# Patient Record
Sex: FEMALE | Race: ASIAN
[De-identification: names, ages, dates, MRNs, and addresses within clinical notes are randomized per-mention and may not be internally consistent; named-entity substitution may affect disease eponyms.]

---

## 2017-06-27 ENCOUNTER — HOSPITAL ENCOUNTER (INPATIENT)
Dept: HOSPITAL 74 - JDEL | Age: 32
LOS: 4 days | Discharge: HOME | End: 2017-07-01
Attending: OBSTETRICS & GYNECOLOGY | Admitting: OBSTETRICS & GYNECOLOGY
Payer: COMMERCIAL

## 2017-06-27 VITALS — BODY MASS INDEX: 20.9 KG/M2

## 2017-06-27 DIAGNOSIS — R07.89: ICD-10-CM

## 2017-06-27 DIAGNOSIS — Z3A.39: ICD-10-CM

## 2017-06-27 DIAGNOSIS — O34.211: Primary | ICD-10-CM

## 2017-06-27 DIAGNOSIS — D64.89: ICD-10-CM

## 2017-06-27 LAB
ANION GAP SERPL CALC-SCNC: 11 MMOL/L (ref 8–16)
ANION GAP SERPL CALC-SCNC: 11 MMOL/L (ref 8–16)
APTT BLD: 24.9 SECONDS (ref 26.9–34.4)
APTT BLD: 25 SECONDS (ref 26.9–34.4)
BASOPHILS # BLD: 0.5 % (ref 0–2)
BASOPHILS # BLD: 0.8 % (ref 0–2)
CALCIUM SERPL-MCNC: 8.3 MG/DL (ref 8.5–10.1)
CALCIUM SERPL-MCNC: 8.9 MG/DL (ref 8.5–10.1)
CO2 SERPL-SCNC: 21 MMOL/L (ref 21–32)
CO2 SERPL-SCNC: 22 MMOL/L (ref 21–32)
CREAT SERPL-MCNC: 0.5 MG/DL (ref 0.55–1.02)
CREAT SERPL-MCNC: 0.5 MG/DL (ref 0.55–1.02)
DEPRECATED RDW RBC AUTO: 27.8 % (ref 11.6–15.6)
DEPRECATED RDW RBC AUTO: 28.8 % (ref 11.6–15.6)
EOSINOPHIL # BLD: 0.3 % (ref 0–4.5)
EOSINOPHIL # BLD: 0.3 % (ref 0–4.5)
GLUCOSE SERPL-MCNC: 108 MG/DL (ref 74–106)
GLUCOSE SERPL-MCNC: 96 MG/DL (ref 74–106)
HIV 1 & 2 AB: NEGATIVE
HIV 1 AGP24: NEGATIVE
INR BLD: 0.91 (ref 0.82–1.09)
INR BLD: 0.95 (ref 0.82–1.09)
MCH RBC QN AUTO: 26.5 PG (ref 25.7–33.7)
MCH RBC QN AUTO: 26.9 PG (ref 25.7–33.7)
MCHC RBC AUTO-ENTMCNC: 32.4 G/DL (ref 32–36)
MCHC RBC AUTO-ENTMCNC: 33.1 G/DL (ref 32–36)
MCV RBC: 81.3 FL (ref 80–96)
MCV RBC: 81.8 FL (ref 80–96)
NEUTROPHILS # BLD: 68.4 % (ref 42.8–82.8)
NEUTROPHILS # BLD: 71.2 % (ref 42.8–82.8)
PLATELET # BLD AUTO: 279 K/MM3 (ref 134–434)
PLATELET # BLD AUTO: 291 K/MM3 (ref 134–434)
PMV BLD: 8.5 FL (ref 7.5–11.1)
PMV BLD: 8.7 FL (ref 7.5–11.1)
PT PNL PPP: 10 SEC (ref 9.98–11.88)
PT PNL PPP: 10.4 SEC (ref 9.98–11.88)
WBC # BLD AUTO: 10.8 K/MM3 (ref 4–10)
WBC # BLD AUTO: 10.9 K/MM3 (ref 4–10)

## 2017-06-27 RX ADMIN — SODIUM CHLORIDE, SODIUM GLUCONATE, SODIUM ACETATE, POTASSIUM CHLORIDE, AND MAGNESIUM CHLORIDE SCH MLS/HR: 526; 502; 368; 37; 30 INJECTION, SOLUTION INTRAVENOUS at 21:50

## 2017-06-27 NOTE — HP
Past Medical History





- Primary Care Physician


PCP:: Mallory Menendez





- Admission


Chief Complaint: Spontaneous rupture of memmbranes


History of Present Illness: 


32 yo  EDC 17 ega 39.6 weeks with c/o srom at 630pm no bleeding or HA 


uncomplicated prenatal 


History Source: Patient





- Past Medical History


...: 2


...Para: 0


...Term: 0


...: 0


...Spon : 1


...Induced : 0


...Multiple Gestation: 0


...LMP: 16


... Weeks Gestation by Dates: 39.5


...EDC by Dates: 17


...EDC by Sono: 17


Additional OB History: Missed ab





- Past Surgical History


Past Surgical History: Yes: None


Hx Myomectomy: No


Hx Transabdominal Cerclage: No


Additional Surgical History: DC x 1 





- Smoking History


Smoking history: Never smoked


Have you smoked in the past 12 months: No





- Alcohol/Substance Use


Hx Alcohol Use: No


History of Substance Use: reports: None





- Social History


Usual Living Arrangement: Yes: With Spouse


History of Recent Travel: No





Home Medications





- Allergies


Allergies/Adverse Reactions: 


 Allergies











Allergy/AdvReac Type Severity Reaction Status Date / Time


 


tetracycline Allergy Severe  Verified 16 11:14


 


ibuprofen [From Motrin] Allergy Intermediate  Verified 16 11:14














- Home Medications


Home Medications: 


Ambulatory Orders





Iron 1 tab PO BID 17 


Prenatal Vitamins (Sjr) - 1 tab PO DAILY 17 











Review of Systems





- Review of Systems


Constitutional: reports: No Symptoms


Eyes: reports: No Symptoms


HENT: reports: No Symptoms


Neck: reports: No Symptoms


Cardiovascular: reports: No Symptoms


Respiratory: reports: No Symptoms


Gastrointestinal: reports: Abdominal Pain


Genitourinary: reports: No Symptoms


Breasts: reports: No Symptoms Reported


Musculoskeletal: reports: No Symptoms


Integumentary: reports: No Symptoms


Neurological: reports: No Symptoms


Endocrine: reports: No Symptoms


Hematology/Lymphatic: reports: No Symptoms


Psychiatric: reports: No Symptoms





Physical Exam - Maternity


Vital Signs: 


 Vital Signs











Temperature  98.7 F   17 21:00


 


Pulse Rate  114 H  17 21:00


 


Respiratory Rate  18   17 21:00


 


Blood Pressure  118/73   17 21:00


 


O2 Sat by Pulse Oximetry (%)      











Constitutional: Yes: Well Nourished, No Distress


HENT: Yes: WNL


Neck: Yes: WNL


Cardiovascular: Yes: WNL


Lungs: Clear to auscultation


Breast(s): Yes: WNL





- Abdominal Exam/OB


Fundal Height: 40


Number of Fetuses: Single


Fetal Presentation: Vertex


Contractions: Yes


Regularity: Irritability


Intensity: Mild/Mod


Fetal Monitor Mode: External


Category: I


Accelerations: Non-Uniform


Decelerations: None





- Vaginal Exam/OB


Vaginal Bleediing: No


Dilatation (cm): 1


Effacement (%): 80


Amniotic Membrane Status: Ruptured


Amniotic Fluid: Yes: Clear


Fetal Presentation: Vertex/Position





- Physical Exam


Musculoskeletal: Yes: WNL


Extremities: Yes: WNL


Edema: No


Psychiatric: Yes: WNL, Alert, Oriented





- Labs


Lab Results: 


 CBC, BMP





 17 19:50 





 17 19:50 











Hemorrhage Risk Assessment





- Risk Factors


Risk Score: 0


Risk Level: Low Risk





Problem List





- Problems


(1) Spontaneous rupture of membranes


Code(s): [id number] - 








Assessment/Plan


IUP at 39.5 weeks


SROM


GBS neg


Hx of DC





Plan


Admit 


will observe


Stadol as needed

## 2017-06-28 LAB
ALBUMIN SERPL-MCNC: 2.5 G/DL (ref 3.4–5)
ALP SERPL-CCNC: 190 U/L (ref 45–117)
ALT SERPL-CCNC: 13 U/L (ref 12–78)
ANION GAP SERPL CALC-SCNC: 10 MMOL/L (ref 8–16)
APPEARANCE UR: CLEAR
AST SERPL-CCNC: 14 U/L (ref 15–37)
BASOPHILS # BLD: 1.1 % (ref 0–2)
BILIRUB SERPL-MCNC: 0.2 MG/DL (ref 0.2–1)
BILIRUB UR STRIP.AUTO-MCNC: NEGATIVE MG/DL
CALCIUM SERPL-MCNC: 8.3 MG/DL (ref 8.5–10.1)
CO2 SERPL-SCNC: 22 MMOL/L (ref 21–32)
COLOR UR: (no result)
CREAT SERPL-MCNC: 0.5 MG/DL (ref 0.55–1.02)
DEPRECATED RDW RBC AUTO: 29.5 % (ref 11.6–15.6)
EOSINOPHIL # BLD: 0.3 % (ref 0–4.5)
GLUCOSE SERPL-MCNC: 94 MG/DL (ref 74–106)
KETONES UR QL STRIP: NEGATIVE
LEUKOCYTE ESTERASE UR QL STRIP.AUTO: NEGATIVE
MCH RBC QN AUTO: 26.4 PG (ref 25.7–33.7)
MCHC RBC AUTO-ENTMCNC: 32.1 G/DL (ref 32–36)
MCV RBC: 82.1 FL (ref 80–96)
NEUTROPHILS # BLD: 64.2 % (ref 42.8–82.8)
NITRITE UR QL STRIP: NEGATIVE
PH BLDV: 7.39 [PH] (ref 7.32–7.42)
PH UR: 7 [PH] (ref 5–8)
PLATELET # BLD AUTO: 232 K/MM3 (ref 134–434)
PMV BLD: 8.4 FL (ref 7.5–11.1)
PROT SERPL-MCNC: 6 G/DL (ref 6.4–8.2)
PROT UR QL STRIP: NEGATIVE
PROT UR QL STRIP: NEGATIVE
RBC # BLD AUTO: 12 /HPF (ref 0–3)
RBC # UR STRIP: (no result) /UL
SAO2 % BLDV: 71.4 MEQ/L (ref 19–25)
SP GR UR: 1.01 (ref 1–1.02)
TROPONIN I SERPL-MCNC: < 0.02 NG/ML (ref 0–0.05)
UROBILINOGEN UR STRIP-MCNC: NEGATIVE E.U./DL (ref 0.2–1)
WBC # BLD AUTO: 11.2 K/MM3 (ref 4–10)
WBC # UR AUTO: 4 /HPF (ref 3–5)

## 2017-06-28 RX ADMIN — SODIUM CHLORIDE, SODIUM GLUCONATE, SODIUM ACETATE, POTASSIUM CHLORIDE, AND MAGNESIUM CHLORIDE SCH MLS/HR: 526; 502; 368; 37; 30 INJECTION, SOLUTION INTRAVENOUS at 08:05

## 2017-06-28 RX ADMIN — ACETAMINOPHEN PRN MG: 325 TABLET ORAL at 23:00

## 2017-06-28 NOTE — HOSP
Physical Examination


Vital Signs: 


 Vital Signs











Temperature  97.9 F   06/28/17 10:00


 


Pulse Rate  87   06/28/17 11:00


 


Respiratory Rate  20   06/28/17 11:00


 


Blood Pressure  120/87   06/28/17 11:00


 


O2 Sat by Pulse Oximetry (%)      











Labs: 


 CBC, BMP





 06/28/17 06:43 





 06/28/17 06:43 











Hospitalist Encounter


Assessment: 


Labs returned and patient's Troponins were negative .  CMP had elevated Alk 

phos of 190.  Patient still complains of a headache and is still anxious.    





PLAN:


-Repeat Troponin with repeat EKG


-Tylenol 650mg Q6H PRN for headache 


-Will follow up on labs 





Visit type





- Emergency Visit


Emergency Visit: Yes


ED Registration Date: 06/27/17


Care time: The patient presented to the Emergency Department on the above date 

and was hospitalized for further evaluation of their emergent condition.





- New Patient


This patient is new to me today: Yes


Date on this admission: 06/28/17





- Critical Care


Critical Care patient: No

## 2017-06-28 NOTE — HOSP
Subjective





- Review of Symptoms


Subjective: 


Called for evaluation of chest pain, dizziness, hallucinations post Stadol





At bedside pt. is [id number]





Physical:


/82, rr 18 02 98%





GEN: Nad, resting in bed


HEENT: NCAT, PERRL


CARD: RRR S1, S2


RESP: CTAB


ABD: BSX4, NTD to palpation


EXT: - C/C/E





EKG





A/P.)


Chest Pain/Dizziness


- Most likely medication reaction Avoid Stadol


- Follow Troponin, CMP, UA and CBC





- Will continue to follow pt.























Physical Examination


Vital Signs: 


 Vital Signs











Temperature  97.8 F   06/28/17 06:00


 


Pulse Rate  86   06/28/17 06:00


 


Respiratory Rate  20   06/28/17 06:00


 


Blood Pressure  130/69   06/28/17 06:00


 


O2 Sat by Pulse Oximetry (%)

## 2017-06-28 NOTE — OP
Operative Note





- Note:


Operative Date: 17


Pre-Operative Diagnosis: Elective .  spontaneous rupture of membranes.  

IUp at 39 weeks


Operation: Primary Low transverse  Section


Findings: 


Live female infant


Post-Operative Diagnosis: Same as Pre-op


Surgeon: Mallory Menendez


Assistant: Saturnino Caldera


Anesthesia: Spinal


Estimated Blood Loss (mls): 500


Operative Report Dictated: Yes

## 2017-06-28 NOTE — PN
Ante-Partal Exam





- Subjective


Subjective: 


Pt with chest pain 


Vital Signs: 


 Vital Signs











Temperature  97.8 F   17 06:00


 


Pulse Rate  86   17 06:00


 


Respiratory Rate  20   17 06:00


 


Blood Pressure  130/69   17 06:00


 


O2 Sat by Pulse Oximetry (%)      











Bleeding: No


Headache: No


Visual changes: No


Right upper quadrant pain: No





- Contractions


Contractions: Yes


Regularity: Irregular


Intensity: Mild


Fetal Monitor Mode: External





- Exam during Labor


Fetal Heart Rate: 140


Variability: Moderate


Fetal Heart Rate Location: [facility]


Category: I


Fetal Monitor Accelerations: Present


Fetal Monitor Decelerations: None


Exam: Vaginal


Dilatation (cm): 1 cm


Effacement (%): 90


Amniotic Membrane Status: Intact


Fetal Presentation: Vertex





- Intrapartum Hemorrhage Risk


Risk Score: 0


Risk Level: Low Risk





- Assessment/Plan


Assessment/Plan: 


hospitialist consult called 


troponin drawn


pt desire  Section








Plan 


will do CS if patient desire


Notify peds and anesthesia

## 2017-06-28 NOTE — OP
DATE OF OPERATION:  2017 

 

PREOPERATIVE DIAGNOSIS:  Elective  section.

 

POSTOPERATIVE DIAGNOSIS:  Live female infant.  

 

OPERATION:  Primary low-transverse  section.

 

SURGEON:  Mallory Menendez MD

 

ASSISTANT:  CHRISTY Montana (MD unavailable)

 

ANESTHESIA:  Spinal.

 

ANESTHESIOLOGIST:  Sky Ann DO 

 

DESCRIPTION OF PROCEDURE:  The patient was taken to the operating room, placed 
in the

supine position, prepped and draped in the usual sterile fashion.  A time-out 
was

performed in accordance with hospital regulation.  A Pfannenstiel skin incision 
was

made with the scalpel.  Cautery was then used to go through the layers of the

abdominal wall to the level of the fascia.  The fascia was cut in the midline 
and

cautery was then used to open the fascia in a smiling fashion.  Kocher was then 
used

to bluntly and sharply dissect the rectus muscle and fascia.  The muscle was 
splint

in the midline.  The peritoneal cavity was then entered and carried upward and

downward.  A bladder retractor was then placed.  The vesicouterine reflection 
was

then entered.  The bladder was bluntly dissected out of the operative field.  A

scalpel was then used to make a low transverse uterine incision.  The incision 
was

carried upward using bandage scissors. No fluid was seen within the uterus.

 

A live female infant was delivered in OT position.  Nose and mouth suction was

performed.  The shoulders were delivered without difficulty.  The cord was 
clamped

and cut.  Cord blood was obtained.   

 

The placenta was manually extracted from the uterus.  The uterus was 
exteriorized and

cleaned with clean lap pads.  The uterine incision was then closed using 0 
Biosyn

suture, the first layer continuous interlocking and the second layer 
imbricating the

first layer.  Hemostasis was achieved.  The tube and ovaries were normal.  The 
uterus

was interiorized.  The abdominal cavity was cleaned with clean lap pads. 
Abdominal sweep done prior to closure.

 The

peritoneum was then closed using 0 Biosyn.  The fascia was then closed using 0 
Vicryl

suture and the muscles approximated in the midline using 0 Biosyn suture.  The

subcutaneous was closed with interrupted 0 Biosyn.  The skin was then closed 
using

3-0 Biosyn in subcuticular fashion.  The wound was washed and dressed.   

 

The patient tolerated the procedure well.  She was taken to the recovery room in

stable condition.  

 

Estimated blood loss was 600 mL.  

 

Wound classification:  Clean, uncontaminated.  

 

 

 

 

BRETT [id number]

DD: 2017 14:37

DT: 2017 14:57

Job #:  [id number]

MTDD

## 2017-06-28 NOTE — PN
Ante-Partal Exam





- Subjective


Vital Signs: 


 Vital Signs











Temperature  97.9 F   06/28/17 13:00


 


Pulse Rate  89   06/28/17 13:00


 


Respiratory Rate  18   06/28/17 13:00


 


Blood Pressure  120/71   06/28/17 13:00


 


O2 Sat by Pulse Oximetry (%)      











Bleeding: No


Headache: No


Visual changes: No


Right upper quadrant pain: No





- Contractions


Contractions: No





- Exam during Labor


Variability: Moderate


Category: I


Fetal Monitor Decelerations: None


Exam: Vaginal


Amniotic Membrane Status: Ruptured


Fetal Presentation: Vertex





- Assessment/Plan


Assessment/Plan: 


Cat 1 


desires elective CS





Plan


CS

## 2017-06-29 LAB
ANISOCYTOSIS BLD QL SMEAR: (no result)
BASOPHILS # BLD: 0.5 % (ref 0–2)
DEPRECATED RDW RBC AUTO: 29.2 % (ref 11.6–15.6)
EOSINOPHIL # BLD: 0.2 % (ref 0–4.5)
MACROCYTES BLD QL SMEAR: (no result)
MCH RBC QN AUTO: 26.5 PG (ref 25.7–33.7)
MCHC RBC AUTO-ENTMCNC: 32.1 G/DL (ref 32–36)
MCV RBC: 82.8 FL (ref 80–96)
NEUTROPHILS # BLD: 75.1 % (ref 42.8–82.8)
PLATELET # BLD AUTO: 250 K/MM3 (ref 134–434)
PMV BLD: 8.2 FL (ref 7.5–11.1)
POLYCHROMASIA BLD QL SMEAR: (no result)
WBC # BLD AUTO: 12.9 K/MM3 (ref 4–10)

## 2017-06-29 RX ADMIN — ACETAMINOPHEN PRN MG: 325 TABLET ORAL at 23:04

## 2017-06-29 RX ADMIN — ACETAMINOPHEN PRN MG: 325 TABLET ORAL at 13:23

## 2017-06-29 RX ADMIN — ACETAMINOPHEN PRN MG: 325 TABLET ORAL at 06:20

## 2017-06-29 NOTE — EKG
Test Reason : 

Blood Pressure : ***/*** mmHG

Vent. Rate : 080 BPM     Atrial Rate : 080 BPM

   P-R Int : 150 ms          QRS Dur : 076 ms

    QT Int : 416 ms       P-R-T Axes : 000 128 159 degrees

   QTc Int : 479 ms

 

NORMAL SINUS RHYTHM

LATERAL INFARCT , AGE UNDETERMINED

T WAVE ABNORMALITY, CONSIDER ANTERIOR ISCHEMIA

ABNORMAL ECG

NO PREVIOUS ECGS AVAILABLE

Confirmed by CARLOS ALMEIDA MD ([id number]) on 6/29/2017 11:14:24 AM

 

Referred By:             Confirmed By:CARLOS ALMEIDA MD

## 2017-06-29 NOTE — PN
Post Partum Progress Note





- Subjective


Subjective: 


Pt seen/evaluated and doing well.  Having some incisional pain but otherwise 

feels well.   Tolerating clears.  Not yet ambulating.  Stanley catheter with 

clear yellow urine, no void yet.  No flatus yet.  VB minimal.  NO CP/SOB/F/C/HA.


Type of Delivery: Primary C/S


Vital Signs: 


 Vital Signs











Temperature  98.2 F   17 06:00


 


Pulse Rate  94 H  17 06:00


 


Respiratory Rate  18   17 07:00


 


Blood Pressure  126/80   17 06:00


 


O2 Sat by Pulse Oximetry (%)  99   17 15:52











Uterus: Yes: Fundus Firm, Fundus below umbilicus


Incision: Yes: Dressing dry and intact


Abdomen/GI: Yes: Abdomen soft, Tender (appropriately tender post op), 

Tolerating PO (clears).  No: Passing flatus


Lochia: Yes: Rubra


Lochia, amount: Small


Extremities: Yes: Calves non-tender.  No: Edema


Perineum: Yes: Intact


Activity: Ambulating





- Labs


Labs: 


 CBC











WBC  12.9 K/mm3 (4.0-10.0)  H  17  07:15    


 


RBC  3.60 M/mm3 (3.60-5.2)   17  07:15    


 


Hgb  9.5 GM/dL (10.7-15.3)  L  17  07:15    


 


Hct  29.8 % (32.4-45.2)  L  17  07:15    


 


MCV  82.8 fl (80-96)   17  07:15    


 


MCHC  32.1 g/dl (32.0-36.0)   17  07:15    


 


RDW  29.2 % (11.6-15.6)  H  17  07:15    


 


Plt Count  250 K/MM3 (134-434)   17  07:15    


 


MPV  8.2 fl (7.5-11.1)   17  07:15    


 


Neutrophils %  75.1 % (42.8-82.8)   17  07:15    


 


Lymphocytes %  20.6 % (8-40)  D 17  07:15    


 


Monocytes %  3.6 % (3.8-10.2)  L  17  07:15    


 


Eosinophils %  0.2 % (0-4.5)   17  07:15    


 


Basophils %  0.5 % (0-2.0)   17  07:15    














Problem List





- Problems


(1)  delivery delivered


Code(s): O82 - ENCOUNTER FOR  DELIVERY WITHOUT INDICATION





(2) Anemia


Code(s): D64.9 - ANEMIA, UNSPECIFIED   








Assessment/Plan


32 y/o POD#1 s/p elective primary  delivery, doing well


- AFVSS


- Hgb 9.5 this a.m., stable


- encourage ambulation, d/c stanley, advance diet as regular


- routine care

## 2017-06-29 NOTE — PN
Progress Note (short form)





- Note


Progress Note: 


Anesthesiology


POD#1 s/p C/S under spinal anesthesia. Pt. feels well, denies h/a or problems 

walking. Pain well controlled. VSS.

## 2017-06-30 RX ADMIN — ACETAMINOPHEN PRN MG: 325 TABLET ORAL at 08:46

## 2017-06-30 RX ADMIN — ACETAMINOPHEN PRN MG: 325 TABLET ORAL at 21:14

## 2017-06-30 NOTE — PN
Progress Note, Physician


History of Present Illness: 


SP  SECTION DAY 2 SHE OFFERS NO COMPLAINTS





- Current Medication List


Current Medications: 


Active Medications





Acetaminophen (Tylenol -)  650 mg PO Q6H PRN


   PRN Reason: HEADACHE


   Last Admin: 17 23:04 Dose:  650 mg


Acetaminophen (Tylenol -)  650 mg PO Q4H PRN


   PRN Reason: FEVER OR PAIN


   Last Admin: 17 08:46 Dose:  650 mg


Benzocaine (Americaine Ointment -)  1 applic VA PRN PRN


   PRN Reason: PAIN


Benzocaine (Americaine 20% Spray -)  1 spray TP PRN PRN


   PRN Reason: PAIN


Bisacodyl (Dulcolax Suppository -)  10 mg RC PRN PRN


   PRN Reason: CONSTIPATION


Methylergonovine Maleate (Methergine Injection -)  0.2 mg IM Q4H PRN


   PRN Reason: EXCESSIVE BLEEDING


Oxycodone HCl (Roxicodone -)  5 mg PO Q4H PRN


   PRN Reason: PAIN


   Last Admin: 17 08:46 Dose:  5 mg


Oxycodone HCl (Roxicodone -)  10 mg PO Q4H PRN


   PRN Reason: PAIN


Witch Hazel/Glycerin (Tucks Pads -)  1 pad TP PRN PRN


   PRN Reason: PAIN











- Objective


Vital Signs: 


 Vital Signs











Temperature  98.4 F   17 08:30


 


Pulse Rate  71   17 08:30


 


Respiratory Rate  20   17 08:30


 


Blood Pressure  122/74   17 08:30


 


O2 Sat by Pulse Oximetry (%)  99   17 15:52











Constitutional: Yes: Well Nourished, No Distress


Eyes: Yes: WNL, Occular Prosthesis


HENT: Yes: WNL


Neck: Yes: WNL, Supple


Cardiovascular: Yes: WNL, Regular Rate and Rhythm


Respiratory: Yes: WNL, Regular


Gastrointestinal: Yes: WNL


...Rectal Exam: Yes: Deferred


Genitourinary: Yes: WNL


Breast(s): Yes: WNL


Musculoskeletal: Yes: WNL


Extremities: Yes: WNL


Integumentary: Yes: WNL


Wound/Incision: Yes: Clean/Dry, Well Approximated


...Motor Strength: WNL


Psychiatric: Yes: Alert, Oriented


Labs: 


 CBC, BMP





 17 07:15 





 17 06:43 





 INR, PTT











INR  0.91  (0.82-1.09)   17  21:35    














Assessment/Plan


CONDITION IS STABLE ON 2ND POST OP DAY 


CONTINIE CURRENT CARE

## 2017-07-01 VITALS — DIASTOLIC BLOOD PRESSURE: 76 MMHG | SYSTOLIC BLOOD PRESSURE: 120 MMHG | TEMPERATURE: 99 F | HEART RATE: 70 BPM

## 2017-07-01 RX ADMIN — ACETAMINOPHEN PRN MG: 325 TABLET ORAL at 10:24

## 2017-07-01 NOTE — PN
Progress Note (SOAP)





- Subjective


Chief Complaint: 


Pt doing well





- Current Medications


Current Medications: 


Active Medications





Acetaminophen (Tylenol -)  650 mg PO Q6H PRN


   PRN Reason: HEADACHE


   Last Admin: 06/30/17 21:14 Dose:  650 mg


Acetaminophen (Tylenol -)  650 mg PO Q4H PRN


   PRN Reason: FEVER OR PAIN


   Last Admin: 06/30/17 08:46 Dose:  650 mg


Benzocaine (Americaine Ointment -)  1 applic ME PRN PRN


   PRN Reason: PAIN


Benzocaine (Americaine 20% Spray -)  1 spray TP PRN PRN


   PRN Reason: PAIN


Bisacodyl (Dulcolax Suppository -)  10 mg RC PRN PRN


   PRN Reason: CONSTIPATION


Methylergonovine Maleate (Methergine Injection -)  0.2 mg IM Q4H PRN


   PRN Reason: EXCESSIVE BLEEDING


Oxycodone HCl (Roxicodone -)  5 mg PO Q4H PRN


   PRN Reason: PAIN


   Last Admin: 06/30/17 21:13 Dose:  5 mg


Oxycodone HCl (Roxicodone -)  10 mg PO Q4H PRN


   PRN Reason: PAIN


Witch Hazel/Glycerin (Tucks Pads -)  1 pad TP PRN PRN


   PRN Reason: PAIN











- Objective


Vital Signs: 


 Vital Signs











Temperature  98.9 F   06/30/17 21:39


 


Pulse Rate  100 H  06/30/17 21:39


 


Respiratory Rate  20   06/30/17 21:39


 


Blood Pressure  116/72   06/30/17 21:39


 


O2 Sat by Pulse Oximetry (%)  99   06/28/17 15:52











Constitutional: Yes: Well Nourished, No Distress


Genitourinary: Yes: WNL


....Post Partum: Yes: Uterus firm, Uterus non-tender


Breast(s): Yes: WNL


Musculoskeletal: Yes: WNL


Extremities: Yes: WNL


Edema: No


Wound/Incision: Yes: Steri Strips, Open to air





Labs


Lab Results: 


 CBC, BMP





 06/29/17 07:15 





 06/28/17 06:43 











Problem List





- Problems


(1) Spontaneous rupture of membranes


Code(s): XJP8109 -

## 2019-08-30 ENCOUNTER — HOSPITAL ENCOUNTER (EMERGENCY)
Dept: HOSPITAL 74 - JER | Age: 34
Discharge: HOME | End: 2019-08-30
Payer: COMMERCIAL

## 2019-08-30 VITALS — BODY MASS INDEX: 23.6 KG/M2

## 2019-08-30 VITALS — TEMPERATURE: 98.6 F | DIASTOLIC BLOOD PRESSURE: 62 MMHG | HEART RATE: 81 BPM | SYSTOLIC BLOOD PRESSURE: 104 MMHG

## 2019-08-30 DIAGNOSIS — R07.9: Primary | ICD-10-CM

## 2019-08-30 DIAGNOSIS — E28.2: ICD-10-CM

## 2019-08-30 LAB
ALBUMIN SERPL-MCNC: 3.8 G/DL (ref 3.4–5)
ALP SERPL-CCNC: 92 U/L (ref 45–117)
ALT SERPL-CCNC: 59 U/L (ref 13–61)
AMPHET UR-MCNC: NEGATIVE NG/ML
ANION GAP SERPL CALC-SCNC: 8 MMOL/L (ref 8–16)
AST SERPL-CCNC: 46 U/L (ref 15–37)
BACTERIA # UR AUTO: 14.2 /HPF
BARBITURATES UR-MCNC: NEGATIVE NG/ML
BASOPHILS # BLD: 0.6 % (ref 0–2)
BENZODIAZ UR SCN-MCNC: NEGATIVE NG/ML
BILIRUB SERPL-MCNC: 0.2 MG/DL (ref 0.2–1)
BILIRUB UR STRIP.AUTO-MCNC: NEGATIVE MG/DL
BUN SERPL-MCNC: 9.9 MG/DL (ref 7–18)
CALCIUM SERPL-MCNC: 8.6 MG/DL (ref 8.5–10.1)
CASTS URNS QL MICRO: 1 /LPF (ref 0–8)
CHLORIDE SERPL-SCNC: 109 MMOL/L (ref 98–107)
CO2 SERPL-SCNC: 24 MMOL/L (ref 21–32)
COCAINE UR-MCNC: NEGATIVE NG/ML
COLOR UR: YELLOW
CREAT SERPL-MCNC: 0.6 MG/DL (ref 0.55–1.3)
DEPRECATED RDW RBC AUTO: 14.6 % (ref 11.6–15.6)
EOSINOPHIL # BLD: 1.1 % (ref 0–4.5)
EPITH CASTS URNS QL MICRO: 0.8 /HPF
GLUCOSE SERPL-MCNC: 96 MG/DL (ref 74–106)
HCT VFR BLD CALC: 36.1 % (ref 32.4–45.2)
HGB BLD-MCNC: 12.3 GM/DL (ref 10.7–15.3)
INR BLD: 1.01 (ref 0.83–1.09)
KETONES UR QL STRIP: NEGATIVE
LEUKOCYTE ESTERASE UR QL STRIP.AUTO: NEGATIVE
LYMPHOCYTES # BLD: 46.9 % (ref 8–40)
MCH RBC QN AUTO: 29 PG (ref 25.7–33.7)
MCHC RBC AUTO-ENTMCNC: 33.9 G/DL (ref 32–36)
MCV RBC: 85.5 FL (ref 80–96)
METHADONE UR-MCNC: NEGATIVE NG/ML
MONOCYTES # BLD AUTO: 6.5 % (ref 3.8–10.2)
NEUTROPHILS # BLD: 44.9 % (ref 42.8–82.8)
NITRITE UR QL STRIP: NEGATIVE
OPIATES UR QL SCN: NEGATIVE NG/ML
PCP UR QL SCN: NEGATIVE NG/ML
PH UR: 6 [PH] (ref 5–8)
PLATELET # BLD AUTO: 237 K/MM3 (ref 134–434)
PMV BLD: 9.2 FL (ref 7.5–11.1)
POTASSIUM SERPLBLD-SCNC: 4.1 MMOL/L (ref 3.5–5.1)
PROT SERPL-MCNC: 7.1 G/DL (ref 6.4–8.2)
PROT UR QL STRIP: NEGATIVE
PROT UR QL STRIP: NEGATIVE
PT PNL PPP: 11.9 SEC (ref 9.7–13)
RBC # BLD AUTO: 4 /HPF (ref 0–4)
RBC # BLD AUTO: 4.22 M/MM3 (ref 3.6–5.2)
SODIUM SERPL-SCNC: 141 MMOL/L (ref 136–145)
SP GR UR: 1 (ref 1.01–1.03)
UROBILINOGEN UR STRIP-MCNC: 0.2 MG/DL (ref 0.2–1)
WBC # BLD AUTO: 7.9 K/MM3 (ref 4–10)
WBC # UR AUTO: 0 /HPF (ref 0–5)

## 2019-08-30 NOTE — EKG
Test Reason : 

Blood Pressure : ***/*** mmHG

Vent. Rate : 074 BPM     Atrial Rate : 074 BPM

   P-R Int : 162 ms          QRS Dur : 070 ms

    QT Int : 416 ms       P-R-T Axes : 060 055 030 degrees

   QTc Int : 461 ms

 

NORMAL SINUS RHYTHM

NORMAL ECG

 

Confirmed by JAMI DESAI MD ([id number]) on 8/30/2019 11:41:16 AM

 

Referred By:             Confirmed By:JAMI DESAI MD

## 2019-08-30 NOTE — PDOC
Attending Attestation





- Resident


Resident Name: Mingo Arriola





- ED Attending Attestation


I have performed the following: I have examined & evaluated the patient, The 

case was reviewed & discussed with the resident, I agree w/resident's findings 

& plan, Exceptions are as noted





- HPI


HPI: 





08/30/19 07:10


34F pmh PCOS here with CP since last night.  Px is sharp, central chest, 

radiating to shoulder a/w dyspnea, palpitations. 





- Physicial Exam


PE: 





08/30/19 07:12


Agree with exam as documented by resident





- Medical Decision Making





08/30/19 07:12


34F with CP a/w dyspnea, PERC neg


endorses +family hx of CAD in father and grandfather both presenting in their 30

's





f/u labs, trend troponin





HEART early discharge pathway score 3, low risk


Moderately suspicious history


EKG with old twi


34F


+risk factors in family hx


initial trop negative





will follow up repeat troponin, if negative then reasonable for early discharge 

with out patient follow up





pt signed out to day team

## 2019-08-30 NOTE — PDOC
*Physical Exam





- Vital Signs


 Last Vital Signs











Temp Pulse Resp BP Pulse Ox


 


 98 F   70   18   104/72   100 


 


 08/30/19 05:10  08/30/19 07:12  08/30/19 07:12  08/30/19 07:12  08/30/19 07:12














- Physical Exam


General Appearance: Yes: Nourished, Appropriately Dressed.  No: Apparent 

Distress


HEENT: positive: EOMI, Normal Voice, Symmetrical.  negative: Scleral Icterus (R)

, Scleral Icterus (L)


Neck: positive: Trachea midline, Supple


Respiratory/Chest: positive: Lungs Clear, Normal Breath Sounds


Cardiovascular: positive: Regular Rhythm, Regular Rate


Gastrointestinal/Abdominal: positive: Soft


Musculoskeletal: positive: Normal Inspection


Extremity: positive: Normal Inspection, Normal Range of Motion


Integumentary: positive: Normal Color, Dry, Warm


Neurologic: positive: Fully Oriented, Alert, Normal Mood/Affect, Normal Response





ED Treatment Course





- LABORATORY


CBC & Chemistry Diagram: 


 08/30/19 06:20





 08/30/19 06:20





- ADDITIONAL ORDERS


Additional order review: 


 Laboratory  Results











  08/30/19 08/30/19 08/30/19





  06:20 06:20 06:20


 


PT with INR  11.80  


 


INR  1.00  


 


Sodium   141 


 


Potassium   4.1 


 


Chloride   109 H 


 


Carbon Dioxide   24 


 


Anion Gap   8 


 


BUN   9.9 


 


Creatinine   0.6 


 


Est GFR (CKD-EPI)AfAm   139.78 


 


Est GFR (CKD-EPI)NonAf   120.61 


 


Random Glucose   96 


 


Calcium   8.6 


 


Total Bilirubin   0.2 


 


AST   46 H 


 


ALT   59 


 


Alkaline Phosphatase   92 


 


Creatine Kinase    68


 


Troponin I    < 0.02


 


Total Protein   7.1 


 


Albumin   3.8 


 


TSH   


 


Urine Color   


 


Urine Appearance   


 


Urine pH   


 


Ur Specific Gravity   


 


Urine Protein   


 


Urine Glucose (UA)   


 


Urine Ketones   


 


Urine Blood   


 


Urine Nitrite   


 


Urine Bilirubin   


 


Urine Urobilinogen   


 


Ur Leukocyte Esterase   


 


Urine WBC (Auto)   


 


Urine RBC (Auto)   


 


Urine Casts (Auto)   


 


U Epithel Cells (Auto)   


 


Urine Bacteria (Auto)   


 


Urine HCG, Qual   


 


Opiates Screen   


 


Methadone Screen   


 


Barbiturate Screen   


 


Phencyclidine Screen   


 


Ur Amphetamines Screen   


 


MDMA (Ecstasy) Screen   


 


Benzodiazepines Screen   


 


Cocaine Screen   


 


U Marijuana (THC) Screen   














  08/30/19 08/30/19 08/30/19





  06:04 06:04 06:04


 


PT with INR   


 


INR   


 


Sodium   


 


Potassium   


 


Chloride   


 


Carbon Dioxide   


 


Anion Gap   


 


BUN   


 


Creatinine   


 


Est GFR (CKD-EPI)AfAm   


 


Est GFR (CKD-EPI)NonAf   


 


Random Glucose   


 


Calcium   


 


Total Bilirubin   


 


AST   


 


ALT   


 


Alkaline Phosphatase   


 


Creatine Kinase   


 


Troponin I   


 


Total Protein   


 


Albumin   


 


TSH   


 


Urine Color   Yellow 


 


Urine Appearance   Error 


 


Urine pH   6.0 


 


Ur Specific Gravity   1.005 L 


 


Urine Protein   Negative 


 


Urine Glucose (UA)   Negative 


 


Urine Ketones   Negative 


 


Urine Blood   1+ H 


 


Urine Nitrite   Negative 


 


Urine Bilirubin   Negative 


 


Urine Urobilinogen   0.2 


 


Ur Leukocyte Esterase   Negative 


 


Urine WBC (Auto)   0 


 


Urine RBC (Auto)   4 


 


Urine Casts (Auto)   1 


 


U Epithel Cells (Auto)   0.8 


 


Urine Bacteria (Auto)   14.2 


 


Urine HCG, Qual    Negative


 


Opiates Screen  Negative  


 


Methadone Screen  Negative  


 


Barbiturate Screen  Negative  


 


Phencyclidine Screen  Negative  


 


Ur Amphetamines Screen  Negative  


 


MDMA (Ecstasy) Screen  Negative  


 


Benzodiazepines Screen  Negative  


 


Cocaine Screen  Negative  


 


U Marijuana (THC) Screen  Negative  














  08/30/19 08/30/19 08/30/19





  05:50 05:50 05:50


 


PT with INR    Cancelled


 


INR    Cancelled


 


Sodium   


 


Potassium   


 


Chloride   


 


Carbon Dioxide   


 


Anion Gap   


 


BUN   


 


Creatinine   


 


Est GFR (CKD-EPI)AfAm   


 


Est GFR (CKD-EPI)NonAf   


 


Random Glucose   


 


Calcium   


 


Total Bilirubin   


 


AST   


 


ALT   


 


Alkaline Phosphatase   


 


Creatine Kinase   


 


Troponin I   Cancelled 


 


Total Protein   


 


Albumin   


 


TSH  5.41 H  


 


Urine Color   


 


Urine Appearance   


 


Urine pH   


 


Ur Specific Gravity   


 


Urine Protein   


 


Urine Glucose (UA)   


 


Urine Ketones   


 


Urine Blood   


 


Urine Nitrite   


 


Urine Bilirubin   


 


Urine Urobilinogen   


 


Ur Leukocyte Esterase   


 


Urine WBC (Auto)   


 


Urine RBC (Auto)   


 


Urine Casts (Auto)   


 


U Epithel Cells (Auto)   


 


Urine Bacteria (Auto)   


 


Urine HCG, Qual   


 


Opiates Screen   


 


Methadone Screen   


 


Barbiturate Screen   


 


Phencyclidine Screen   


 


Ur Amphetamines Screen   


 


MDMA (Ecstasy) Screen   


 


Benzodiazepines Screen   


 


Cocaine Screen   


 


U Marijuana (THC) Screen   














  08/30/19





  05:50


 


PT with INR 


 


INR 


 


Sodium  Cancelled


 


Potassium  Cancelled


 


Chloride  Cancelled


 


Carbon Dioxide  Cancelled


 


Anion Gap  Cancelled


 


BUN  Cancelled


 


Creatinine  Cancelled


 


Est GFR (CKD-EPI)AfAm  Cancelled


 


Est GFR (CKD-EPI)NonAf  Cancelled


 


Random Glucose  Cancelled


 


Calcium  Cancelled


 


Total Bilirubin  Cancelled


 


AST  Cancelled


 


ALT  Cancelled


 


Alkaline Phosphatase  Cancelled


 


Creatine Kinase 


 


Troponin I 


 


Total Protein  Cancelled


 


Albumin  Cancelled


 


TSH 


 


Urine Color 


 


Urine Appearance 


 


Urine pH 


 


Ur Specific Gravity 


 


Urine Protein 


 


Urine Glucose (UA) 


 


Urine Ketones 


 


Urine Blood 


 


Urine Nitrite 


 


Urine Bilirubin 


 


Urine Urobilinogen 


 


Ur Leukocyte Esterase 


 


Urine WBC (Auto) 


 


Urine RBC (Auto) 


 


Urine Casts (Auto) 


 


U Epithel Cells (Auto) 


 


Urine Bacteria (Auto) 


 


Urine HCG, Qual 


 


Opiates Screen 


 


Methadone Screen 


 


Barbiturate Screen 


 


Phencyclidine Screen 


 


Ur Amphetamines Screen 


 


MDMA (Ecstasy) Screen 


 


Benzodiazepines Screen 


 


Cocaine Screen 


 


U Marijuana (THC) Screen 








 











  08/30/19





  05:50


 


RBC  Cancelled


 


MCV  Cancelled


 


MCHC  Cancelled


 


RDW  Cancelled


 


MPV  Cancelled


 


Neutrophils %  Cancelled


 


Lymphocytes %  Cancelled


 


Monocytes %  Cancelled


 


Eosinophils %  Cancelled


 


Basophils %  Cancelled














Medical Decision Making





- Medical Decision Making





08/30/19 07:16


Received sign-out from Dr. Arriola.





Patient is a 34F with PCOS presenting with L-sided chest pain.


Got cardiac profile, CBC, CMP, Utox, Upreg, etc.


1st labs hemolyzed, repeats sent and pending.


1st trop 6:30AM, will get repeat trop at 9:30AM for 3hr.


Low threshold to discharge home if 2nd trop negative.


ASA contraindicated 2/2 NSAID allergy.





08/30/19 10:19


3 hour troponin is negative.


Will discharge home as discussed.





*DC/Admit/Observation/Transfer


Diagnosis at time of Disposition: 


Chest pain


Qualifiers:


 Chest pain type: unspecified Qualified Code(s): R07.9 - Chest pain, unspecified








- Discharge Dispostion


Disposition: HOME


Condition at time of disposition: Stable


Decision to Admit order: No





- Referrals


Referrals: 


Matthew Paniagua MD [Primary Care Provider] - 





- Patient Instructions


Printed Discharge Instructions:  DI for Atypical Chest Pain


Additional Instructions: 


Today you were evaluated for chest pain. We did some blood tests, got an X-ray 

of your chest, and performed an EKG to check for a heart attack or any other 

heart disease, and you are not having a heart attack today.





Please follow-up with your primary doctor in the next 3 days if possible, as 

you still need further follow-up to evaluate your chest pain over time to check 

if you may have other heart-related problems that were not detected today. If 

you continue to have persistent chest pain, nausea, vomiting, shoulder pain, 

abdominal pain, shortness of breath, lose consciousness, have palpitations, or 

have any other new or concerning symptoms, please return to the closest 

emergency room.





- Post Discharge Activity


Forms/Work/School Notes:  Parent(s) Back to Work Note, Back to Work

## 2019-08-30 NOTE — PDOC
History of Present Illness





<Fidel Linderson - Last Filed: 19 06:47>





- History of Present Illness


Initial Comments: 





Ms. Mccain is a 35 y/o female with PMH of PCOS, presenting today with left 

sided chest pain. Reports that she started having chest pain around 11pm last 

night which is worsening. Describes the pain as sharp and radiating to the left 

scapula. Reports palpitations. Reports that pain is worse when laying flat and 

made better by leaning forward. Reports shortness of breath at rest. No 

pleuritic chest pain. Has not been sick recently. No fever, no cough. Reports 

nausea but no vomiting. 





PMH: PCOS





SocHX: 





FamHX: dad and grandfather had MI in 





Meds: metformin














<Mingo Arriola - Last Filed: 19 19:16>





- General


Chief Complaint: Chest Pain


Stated Complaint: CHEST DISCOMFORT


Time Seen by Provider: 19 05:15





Past History





<Hector Linder - Last Filed: 19 06:47>





- Past Medical History


Asthma: No


Cancer: No


Cardiac Disorders: No


COPD: No


Diabetes: No


HTN: No


Seizures: No


Thyroid Disease: No





- Reproductive History


 (#): 1


Para: 0





- Suicide/Smoking/Psychosocial Hx


Smoking History: Never smoked


Have you smoked in the past 12 months: No


Hx Alcohol Use: No


Drug/Substance Use Hx: No


Substance Use Type: None


Hx Substance Use Treatment: No





<Mingo Arriola - Last Filed: 19 19:16>





- Past Medical History


Allergies/Adverse Reactions: 


 Allergies











Allergy/AdvReac Type Severity Reaction Status Date / Time


 


tetracycline Allergy Severe  Verified 16 11:14


 


ibuprofen [From Motrin] Allergy Intermediate  Verified 16 11:14


 


butorphanol [From Stadol] Allergy   Verified 19 05:39


 


doxycycline Allergy   Verified 19 05:39











Home Medications: 


Ambulatory Orders





Metformin HCl [Metformin HCl ER] 500 mg PO DAILY 19 











**Review of Systems





- Review of Systems


Comments:: 





ROS


GENERAL/CONSTITUTIONAL: No fever or chills. No weakness._


HEAD, EYES, EARS, NOSE AND THROAT: No change in vision. No change in hearing. 

No sore throat._


CARDIOVASCULAR: Reports chest pain and shortness of breath. 


RESPIRATORY: Denies cough, hemoptysis_


GASTROINTESTINAL: No nausea, vomiting, diarrhea or constipation._


GENITOURINARY: No dysuria, frequency, or change in urination._


MUSCULOSKELETAL: No joint or muscle swelling or pain. No neck or back pain._


SKIN: No rash_


NEUROLOGIC: No headache, vertigo, loss of consciousness, or change in strength/

sensation._


ENDOCRINE: No increased thirst. No abnormal weight change_


HEMATOLOGIC/LYMPHATIC: No anemia, easy bleeding, or history of blood clots._


ALLERGIC/IMMUNOLOGIC: No hives or skin allergy._














<Mingo Arriola - Last Filed: 19 19:16>





*Physical Exam





- Vital Signs


 Last Vital Signs











Temp Pulse Resp BP Pulse Ox


 


 98 F   82   20   128/81   100 


 


 19 05:10  19 05:10  19 05:10  19 05:10  19 05:10














<Hector Linder - Last Filed: 19 06:47>





- Vital Signs


 Last Vital Signs











Temp Pulse Resp BP Pulse Ox


 


 98 F   82   20   128/81   100 


 


 19 05:10  19 05:10  19 05:10  19 05:10  19 05:10














- Physical Exam


Comments: 








GENERAL: Awake, alert, and oriented to person/place/time, in no acute distress_


HEAD: No signs of trauma, normocephalic, atraumatic _


EYES: PERRLA, EOMI, sclera anicteric, conjunctiva clear_


ENT: Hearing grossly normal, nares patent, oropharynx clear without exudates. 

No uvular deviation. Moist mucosa_


NECK: Normal ROM, supple, no lymphadenopathy, JVD, or masses_


LUNGS: No distress, speaks in full sentences, clear to auscultation bilaterally 

_


HEART: Regular rate and rhythm, normal S1 and S2, no murmurs appreciated, 

peripheral pulses normal and equal bilaterally._


CHEST: no TTP over left chest. 


ABDOMEN: Soft, nontender, normoactive bowel sounds. No guarding, no rebound. No 

masses_


EXTREMITIES: Normal inspection, Normal range of motion, no edema. No clubbing 

or cyanosis_


NEUROLOGICAL: Cranial nerves II through XII grossly intact. Normal speech, 

normal gait, no focal sensorimotor deficits _


SKIN: Warm, Dry, normal turgor, no rashes or lesions noted_








<Mingo Arriola - Last Filed: 19 19:16>





**Heart Score/ECG Review





- History


History: Moderately suspicious





- Electrocardiogram


EKG: Normal





- Age


Age: </= 45





- Risk Factors


Risk Factors Heart Score: Yes Positive family hx of cardiac disease


Based on the list above the patient has:: 1-2 risk factors





- Troponin


Troponin: </= normal limit





- Score


Heart Score - Total: 2





<Mingo Arriola - Last Filed: 19 19:16>





ED Treatment Course





- LABORATORY


CBC & Chemistry Diagram: 


 19 05:50





 19 05:50





- ADDITIONAL ORDERS


Additional order review: 


 Laboratory  Results











  19





  06:04 06:04 06:04


 


PT with INR   


 


INR   


 


Sodium   


 


Potassium   


 


Chloride   


 


Carbon Dioxide   


 


Anion Gap   


 


BUN   


 


Creatinine   


 


Est GFR (CKD-EPI)AfAm   


 


Est GFR (CKD-EPI)NonAf   


 


Random Glucose   


 


Calcium   


 


Total Bilirubin   


 


AST   


 


ALT   


 


Alkaline Phosphatase   


 


Troponin I   


 


Total Protein   


 


Albumin   


 


TSH   


 


Urine Color   Yellow 


 


Urine Appearance   Error 


 


Urine pH   6.0 


 


Ur Specific Gravity   1.005 L 


 


Urine Protein   Negative 


 


Urine Glucose (UA)   Negative 


 


Urine Ketones   Negative 


 


Urine Blood   1+ H 


 


Urine Nitrite   Negative 


 


Urine Bilirubin   Negative 


 


Urine Urobilinogen   0.2 


 


Ur Leukocyte Esterase   Negative 


 


Urine WBC (Auto)   0 


 


Urine RBC (Auto)   4 


 


Urine Casts (Auto)   1 


 


U Epithel Cells (Auto)   0.8 


 


Urine Bacteria (Auto)   14.2 


 


Urine HCG, Qual    Negative


 


Opiates Screen  Negative  


 


Methadone Screen  Negative  


 


Barbiturate Screen  Negative  


 


Phencyclidine Screen  Negative  


 


Ur Amphetamines Screen  Negative  


 


MDMA (Ecstasy) Screen  Negative  


 


Benzodiazepines Screen  Negative  


 


Cocaine Screen  Negative  


 


U Marijuana (THC) Screen  Negative  














  19





  05:50 05:50 05:50


 


PT with INR    Cancelled


 


INR    Cancelled


 


Sodium   


 


Potassium   


 


Chloride   


 


Carbon Dioxide   


 


Anion Gap   


 


BUN   


 


Creatinine   


 


Est GFR (CKD-EPI)AfAm   


 


Est GFR (CKD-EPI)NonAf   


 


Random Glucose   


 


Calcium   


 


Total Bilirubin   


 


AST   


 


ALT   


 


Alkaline Phosphatase   


 


Troponin I   Cancelled 


 


Total Protein   


 


Albumin   


 


TSH  5.41 H  


 


Urine Color   


 


Urine Appearance   


 


Urine pH   


 


Ur Specific Gravity   


 


Urine Protein   


 


Urine Glucose (UA)   


 


Urine Ketones   


 


Urine Blood   


 


Urine Nitrite   


 


Urine Bilirubin   


 


Urine Urobilinogen   


 


Ur Leukocyte Esterase   


 


Urine WBC (Auto)   


 


Urine RBC (Auto)   


 


Urine Casts (Auto)   


 


U Epithel Cells (Auto)   


 


Urine Bacteria (Auto)   


 


Urine HCG, Qual   


 


Opiates Screen   


 


Methadone Screen   


 


Barbiturate Screen   


 


Phencyclidine Screen   


 


Ur Amphetamines Screen   


 


MDMA (Ecstasy) Screen   


 


Benzodiazepines Screen   


 


Cocaine Screen   


 


U Marijuana (THC) Screen   














  19





  05:50


 


PT with INR 


 


INR 


 


Sodium  Cancelled


 


Potassium  Cancelled


 


Chloride  Cancelled


 


Carbon Dioxide  Cancelled


 


Anion Gap  Cancelled


 


BUN  Cancelled


 


Creatinine  Cancelled


 


Est GFR (CKD-EPI)AfAm  Cancelled


 


Est GFR (CKD-EPI)NonAf  Cancelled


 


Random Glucose  Cancelled


 


Calcium  Cancelled


 


Total Bilirubin  Cancelled


 


AST  Cancelled


 


ALT  Cancelled


 


Alkaline Phosphatase  Cancelled


 


Troponin I 


 


Total Protein  Cancelled


 


Albumin  Cancelled


 


TSH 


 


Urine Color 


 


Urine Appearance 


 


Urine pH 


 


Ur Specific Gravity 


 


Urine Protein 


 


Urine Glucose (UA) 


 


Urine Ketones 


 


Urine Blood 


 


Urine Nitrite 


 


Urine Bilirubin 


 


Urine Urobilinogen 


 


Ur Leukocyte Esterase 


 


Urine WBC (Auto) 


 


Urine RBC (Auto) 


 


Urine Casts (Auto) 


 


U Epithel Cells (Auto) 


 


Urine Bacteria (Auto) 


 


Urine HCG, Qual 


 


Opiates Screen 


 


Methadone Screen 


 


Barbiturate Screen 


 


Phencyclidine Screen 


 


Ur Amphetamines Screen 


 


MDMA (Ecstasy) Screen 


 


Benzodiazepines Screen 


 


Cocaine Screen 


 


U Marijuana (THC) Screen 








 











  19





  05:50


 


RBC  Cancelled


 


MCV  Cancelled


 


MCHC  Cancelled


 


RDW  Cancelled


 


MPV  Cancelled


 


Neutrophils %  Cancelled


 


Lymphocytes %  Cancelled


 


Monocytes %  Cancelled


 


Eosinophils %  Cancelled


 


Basophils %  Cancelled














<Hector Linder - Last Filed: 19 06:47>





- LABORATORY


CBC & Chemistry Diagram: 


 19 06:20





 19 06:20





- RADIOLOGY


Radiology Studies Ordered: 














 Category Date Time Status


 


 CHEST PA & LAT [RAD] Stat Radiology  19 05:33 Ordered














<Mingo Arriola - Last Filed: 19 19:16>





Medical Decision Making





- Medical Decision Making





34F with hx of PCOS and FamHx of MI in late 30's presenting with left side 

chest pain radiating to left scapula that started around 11pm. Associated with 

SOB at rest. 





Plan to obtain CBC, CMP, trop, TSH, coags, EKG, CXR, UA.





Given pt's allergy to ibuprofen, will not give aspirin. 





19 05:50





EKG shows NSR 74 bpm, no axis deviation, no ST elevation/depression, QTc 461 ms.





19 07:00





Pt signed out to Dr. Lehman and Dr. Scales. 











<Mingo Arriola - Last Filed: 19 19:16>





*DC/Admit/Observation/Transfer





<Hector Linder - Last Filed: 19 06:47>





<Mingo Arriola - Last Filed: 19 19:16>


Diagnosis at time of Disposition: 


Chest pain


Qualifiers:


 Chest pain type: unspecified Qualified Code(s): R07.9 - Chest pain, unspecified








- Discharge Dispostion


Disposition: HOME


Condition at time of disposition: Stable





- Referrals


Referrals: 


Matthew Paniagua MD [Primary Care Provider] - 





- Patient Instructions


Printed Discharge Instructions:  DI for Atypical Chest Pain


Additional Instructions: 


Today you were evaluated for chest pain. We did some blood tests, got an X-ray 

of your chest, and performed an EKG to check for a heart attack or any other 

heart disease, and you are not having a heart attack today.





Please follow-up with your primary doctor in the next 3 days if possible, as 

you still need further follow-up to evaluate your chest pain over time to check 

if you may have other heart-related problems that were not detected today. If 

you continue to have persistent chest pain, nausea, vomiting, shoulder pain, 

abdominal pain, shortness of breath, lose consciousness, have palpitations, or 

have any other new or concerning symptoms, please return to the closest 

emergency room.





- Post Discharge Activity


Forms/Work/School Notes:  Back to Work, Parent(s) Back to Work Note

## 2021-09-22 NOTE — PATH
Surgical Pathology Report



Patient Name:  HALIMA CHAKRABORTY

Accession #:  [id number]

[facility]. Rec. #:  [id number]                                                        

   /Age/Gender:  1985 (Age: 31) / F

Account:  [id number]                                                          

             Location: [facility] OBS/GYN

Taken:  2017

Received:  2017

Reported:  7/3/2017

Physicians:  Mallory Menendez M.D.

  



Specimen(s) Received

 PLACENTA 





Clinical History

, SAB x1, patient anemic 9.2/29.3

C/section







Final Diagnosis

PLACENTA, DELIVERY:

FOCALLY DISRUPTED THIRD TRIMESTER PLACENTA WITH MODERATE PREVILLOUS,

PERIVILLOUS, AND PRECHORIONIC FIBRIN DEPOSITION, THREE VESSEL UMBILICAL CORD,

AND PLACENTAL MEMBRANES WITH AMNION HYPERPLASIA AND FOCAL EARLY ACUTE

CHORIOAMNIONITIS.





***Electronically Signed***

Alexander Finkelstein, M.D.





Gross Description

The specimen is received fresh, labeled "placenta" and is a 447 gram, 19.5 x

16.5 x 2.2 cm placenta with attached membranes and umbilical cord.  The attached

membranes are tan, translucent with focal opacities and insert marginally.  The

umbilical cord measures 27 cm in length and averages 1.1 cm in diameter.  The

cord inserts eccentrically, 4 cm to the nearest margin.  No true knots or

strictures are identified. Cut surface of the umbilical cord reveals 3 vessels.

The fetal surface is grey-blue with fibrin deposition and appropriate caliber

vessels.  The maternal surface is red-brown with focal defects.  Sectioning

reveals red-brown, spongy parenchyma.  No focal lesions are identified. 

Representative sections are submitted in three cassettes as follows: 1- membrane

rolls and umbilical cord; 2-3- full thickness sections of placenta.





[facility]
(3) no apparent problem

## 2022-08-14 ENCOUNTER — HOSPITAL ENCOUNTER (INPATIENT)
Dept: HOSPITAL 74 - JLDR | Age: 37
LOS: 3 days | Discharge: HOME | End: 2022-08-17
Attending: OBSTETRICS & GYNECOLOGY | Admitting: OBSTETRICS & GYNECOLOGY
Payer: COMMERCIAL

## 2022-08-14 VITALS — BODY MASS INDEX: 26.6 KG/M2

## 2022-08-14 DIAGNOSIS — Z3A.38: ICD-10-CM

## 2022-08-14 DIAGNOSIS — O34.211: ICD-10-CM

## 2022-08-14 DIAGNOSIS — E28.2: ICD-10-CM

## 2022-08-14 LAB
ANION GAP SERPL CALC-SCNC: 10 MMOL/L (ref 8–16)
APTT BLD: 24.8 SECONDS (ref 25.2–36.5)
BASE EXCESS BLDCOA CALC-SCNC: -3.2 MMOL/L (ref 0–2)
BASE EXCESS BLDCOA CALC-SCNC: -3.4 MMOL/L (ref 0–2)
BASOPHILS # BLD: 0.7 % (ref 0–2)
BUN SERPL-MCNC: 4.8 MG/DL (ref 7–18)
CALCIUM SERPL-MCNC: 8.8 MG/DL (ref 8.5–10.1)
CHLORIDE SERPL-SCNC: 108 MMOL/L (ref 98–107)
CO2 SERPL-SCNC: 21 MMOL/L (ref 21–32)
CREAT SERPL-MCNC: 0.4 MG/DL (ref 0.55–1.3)
DEPRECATED RDW RBC AUTO: 16.8 % (ref 11.6–15.6)
EOSINOPHIL # BLD: 0.7 % (ref 0–4.5)
GLUCOSE SERPL-MCNC: 110 MG/DL (ref 74–106)
HCO3 BLDCO-SCNC: 22 MMHG (ref 20–29)
HCO3 BLDCO-SCNC: 24.1 MMHG (ref 20–29)
HCT VFR BLD CALC: 33.2 % (ref 32.4–45.2)
HGB BLD-MCNC: 12 GM/DL (ref 10.7–15.3)
INR BLD: 0.94 (ref 0.83–1.09)
LYMPHOCYTES # BLD: 30.8 % (ref 8–40)
MCH RBC QN AUTO: 31.7 PG (ref 25.7–33.7)
MCHC RBC AUTO-ENTMCNC: 36.2 G/DL (ref 32–36)
MCV RBC: 87.7 FL (ref 80–96)
MONOCYTES # BLD AUTO: 6 % (ref 3.8–10.2)
NEUTROPHILS # BLD: 61.8 % (ref 42.8–82.8)
PCO2 BLDCO: 40.8 MMHG (ref 30–78)
PCO2 BLDCO: 51.8 MMHG (ref 30–78)
PH BLDCO: 7.29 [PH] (ref 7.14–7.44)
PH BLDCO: 7.35 [PH] (ref 7.14–7.44)
PLATELET # BLD AUTO: 244 10^3/UL (ref 134–434)
PMV BLD: 8 FL (ref 7.5–11.1)
PT PNL PPP: 10.8 SEC (ref 9.7–13)
RBC # BLD AUTO: 3.78 M/MM3 (ref 3.6–5.2)
SODIUM SERPL-SCNC: 139 MMOL/L (ref 136–145)
WBC # BLD AUTO: 10.2 K/MM3 (ref 4–10)

## 2022-08-14 PROCEDURE — U0003 INFECTIOUS AGENT DETECTION BY NUCLEIC ACID (DNA OR RNA); SEVERE ACUTE RESPIRATORY SYNDROME CORONAVIRUS 2 (SARS-COV-2) (CORONAVIRUS DISEASE [COVID-19]), AMPLIFIED PROBE TECHNIQUE, MAKING USE OF HIGH THROUGHPUT TECHNOLOGIES AS DESCRIBED BY CMS-2020-01-R: HCPCS

## 2022-08-14 PROCEDURE — U0005 INFEC AGEN DETEC AMPLI PROBE: HCPCS

## 2022-08-14 RX ADMIN — Medication SCH MLS/HR: at 16:30

## 2022-08-14 RX ADMIN — ACETAMINOPHEN PRN MG: 10 INJECTION, SOLUTION INTRAVENOUS at 19:21

## 2022-08-14 RX ADMIN — CEFAZOLIN SCH MLS/HR: 1 INJECTION, POWDER, FOR SOLUTION INTRAVENOUS at 09:10

## 2022-08-14 RX ADMIN — SODIUM CHLORIDE, SODIUM GLUCONATE, SODIUM ACETATE, POTASSIUM CHLORIDE, AND MAGNESIUM CHLORIDE SCH MLS/HR: 526; 502; 368; 37; 30 INJECTION, SOLUTION INTRAVENOUS at 02:00

## 2022-08-14 RX ADMIN — ENOXAPARIN SODIUM SCH MG: 40 INJECTION SUBCUTANEOUS at 18:16

## 2022-08-14 RX ADMIN — CEFAZOLIN SCH MLS/HR: 1 INJECTION, POWDER, FOR SOLUTION INTRAVENOUS at 18:16

## 2022-08-14 RX ADMIN — Medication SCH MLS/HR: at 07:10

## 2022-08-15 LAB
BASOPHILS # BLD: 0.3 % (ref 0–2)
DEPRECATED RDW RBC AUTO: 16.2 % (ref 11.6–15.6)
EOSINOPHIL # BLD: 0.4 % (ref 0–4.5)
HCT VFR BLD CALC: 29.1 % (ref 32.4–45.2)
HGB BLD-MCNC: 10.3 GM/DL (ref 10.7–15.3)
LYMPHOCYTES # BLD: 26.8 % (ref 8–40)
MCH RBC QN AUTO: 31.2 PG (ref 25.7–33.7)
MCHC RBC AUTO-ENTMCNC: 35.5 G/DL (ref 32–36)
MCV RBC: 88 FL (ref 80–96)
MONOCYTES # BLD AUTO: 4.9 % (ref 3.8–10.2)
NEUTROPHILS # BLD: 67.6 % (ref 42.8–82.8)
PLATELET # BLD AUTO: 219 10^3/UL (ref 134–434)
PMV BLD: 8.3 FL (ref 7.5–11.1)
RBC # BLD AUTO: 3.31 M/MM3 (ref 3.6–5.2)
WBC # BLD AUTO: 11.1 K/MM3 (ref 4–10)

## 2022-08-15 RX ADMIN — CEFAZOLIN SCH MLS/HR: 1 INJECTION, POWDER, FOR SOLUTION INTRAVENOUS at 02:09

## 2022-08-15 RX ADMIN — SIMETHICONE CHEW TAB 80 MG PRN MG: 80 TABLET ORAL at 20:18

## 2022-08-15 RX ADMIN — ENOXAPARIN SODIUM SCH MG: 40 INJECTION SUBCUTANEOUS at 10:29

## 2022-08-15 RX ADMIN — ACETAMINOPHEN PRN MG: 10 INJECTION, SOLUTION INTRAVENOUS at 08:22

## 2022-08-15 RX ADMIN — SIMETHICONE CHEW TAB 80 MG PRN MG: 80 TABLET ORAL at 02:10

## 2022-08-15 RX ADMIN — ACETAMINOPHEN PRN MG: 325 TABLET ORAL at 20:19

## 2022-08-16 RX ADMIN — ENOXAPARIN SODIUM SCH MG: 40 INJECTION SUBCUTANEOUS at 09:16

## 2022-08-16 RX ADMIN — SIMETHICONE CHEW TAB 80 MG PRN MG: 80 TABLET ORAL at 23:15

## 2022-08-16 RX ADMIN — Medication SCH: at 06:02

## 2022-08-16 RX ADMIN — SIMETHICONE CHEW TAB 80 MG PRN MG: 80 TABLET ORAL at 06:03

## 2022-08-16 RX ADMIN — SIMETHICONE CHEW TAB 80 MG PRN MG: 80 TABLET ORAL at 14:10

## 2022-08-16 RX ADMIN — ACETAMINOPHEN PRN MG: 325 TABLET ORAL at 06:03

## 2022-08-16 RX ADMIN — ACETAMINOPHEN PRN MG: 325 TABLET ORAL at 14:09

## 2022-08-16 RX ADMIN — SODIUM CHLORIDE, SODIUM GLUCONATE, SODIUM ACETATE, POTASSIUM CHLORIDE, AND MAGNESIUM CHLORIDE SCH: 526; 502; 368; 37; 30 INJECTION, SOLUTION INTRAVENOUS at 06:06

## 2022-08-16 RX ADMIN — ACETAMINOPHEN PRN MG: 325 TABLET ORAL at 23:15

## 2022-08-17 VITALS
HEART RATE: 83 BPM | RESPIRATION RATE: 20 BRPM | DIASTOLIC BLOOD PRESSURE: 63 MMHG | TEMPERATURE: 97.9 F | SYSTOLIC BLOOD PRESSURE: 105 MMHG

## 2022-08-17 LAB
BASOPHILS # BLD: 0.4 % (ref 0–2)
DEPRECATED RDW RBC AUTO: 16.3 % (ref 11.6–15.6)
EOSINOPHIL # BLD: 1.6 % (ref 0–4.5)
HCT VFR BLD CALC: 30.1 % (ref 32.4–45.2)
HGB BLD-MCNC: 10.5 GM/DL (ref 10.7–15.3)
LYMPHOCYTES # BLD: 39.3 % (ref 8–40)
MCH RBC QN AUTO: 30.8 PG (ref 25.7–33.7)
MCHC RBC AUTO-ENTMCNC: 34.8 G/DL (ref 32–36)
MCV RBC: 88.4 FL (ref 80–96)
MONOCYTES # BLD AUTO: 4.3 % (ref 3.8–10.2)
NEUTROPHILS # BLD: 54.4 % (ref 42.8–82.8)
PLATELET # BLD AUTO: 229 10^3/UL (ref 134–434)
PMV BLD: 8.1 FL (ref 7.5–11.1)
RBC # BLD AUTO: 3.4 M/MM3 (ref 3.6–5.2)
WBC # BLD AUTO: 9.3 K/MM3 (ref 4–10)

## 2022-08-17 RX ADMIN — SIMETHICONE CHEW TAB 80 MG PRN MG: 80 TABLET ORAL at 09:32

## 2022-08-17 RX ADMIN — ENOXAPARIN SODIUM SCH MG: 40 INJECTION SUBCUTANEOUS at 09:26

## 2022-08-17 RX ADMIN — ACETAMINOPHEN PRN MG: 325 TABLET ORAL at 09:32
